# Patient Record
Sex: MALE | Race: ASIAN | NOT HISPANIC OR LATINO | URBAN - METROPOLITAN AREA
[De-identification: names, ages, dates, MRNs, and addresses within clinical notes are randomized per-mention and may not be internally consistent; named-entity substitution may affect disease eponyms.]

---

## 2019-04-22 ENCOUNTER — OFFICE VISIT - HEALTHEAST (OUTPATIENT)
Dept: FAMILY MEDICINE | Facility: CLINIC | Age: 19
End: 2019-04-22

## 2019-04-22 DIAGNOSIS — H10.13 ALLERGIC CONJUNCTIVITIS, BILATERAL: ICD-10-CM

## 2019-07-25 ENCOUNTER — OFFICE VISIT - HEALTHEAST (OUTPATIENT)
Dept: FAMILY MEDICINE | Facility: CLINIC | Age: 19
End: 2019-07-25

## 2019-07-25 ENCOUNTER — COMMUNICATION - HEALTHEAST (OUTPATIENT)
Dept: TELEHEALTH | Facility: CLINIC | Age: 19
End: 2019-07-25

## 2019-07-25 DIAGNOSIS — Z00.00 ANNUAL PHYSICAL EXAM: ICD-10-CM

## 2019-07-25 DIAGNOSIS — Z23 IMMUNIZATION DUE: ICD-10-CM

## 2019-07-25 LAB
ANION GAP SERPL CALCULATED.3IONS-SCNC: 10 MMOL/L (ref 5–18)
BUN SERPL-MCNC: 9 MG/DL (ref 8–22)
CALCIUM SERPL-MCNC: 10.5 MG/DL (ref 8.5–10.5)
CHLORIDE BLD-SCNC: 105 MMOL/L (ref 98–107)
CO2 SERPL-SCNC: 26 MMOL/L (ref 22–31)
CREAT SERPL-MCNC: 0.76 MG/DL (ref 0.7–1.3)
GFR SERPL CREATININE-BSD FRML MDRD: >60 ML/MIN/1.73M2
GLUCOSE BLD-MCNC: 76 MG/DL (ref 70–125)
HGB BLD-MCNC: 16.4 G/DL (ref 14–18)
POTASSIUM BLD-SCNC: 4.2 MMOL/L (ref 3.5–5)
SODIUM SERPL-SCNC: 141 MMOL/L (ref 136–145)

## 2019-07-25 ASSESSMENT — MIFFLIN-ST. JEOR: SCORE: 1752.43

## 2019-07-26 LAB
HBV SURFACE AB SERPL IA-ACNC: NEGATIVE M[IU]/ML
MEV IGG SER IA-ACNC: POSITIVE
MUV IGG SER QL IA: POSITIVE
RUBV IGG SERPL QL IA: POSITIVE
VZV IGG SER QL IA: NEGATIVE

## 2019-07-29 ENCOUNTER — AMBULATORY - HEALTHEAST (OUTPATIENT)
Dept: FAMILY MEDICINE | Facility: CLINIC | Age: 19
End: 2019-07-29

## 2019-07-29 ENCOUNTER — COMMUNICATION - HEALTHEAST (OUTPATIENT)
Dept: FAMILY MEDICINE | Facility: CLINIC | Age: 19
End: 2019-07-29

## 2019-07-29 DIAGNOSIS — Z23 NEED FOR VACCINATION: ICD-10-CM

## 2019-07-29 LAB
GAMMA INTERFERON BACKGROUND BLD IA-ACNC: 0.05 IU/ML
M TB IFN-G BLD-IMP: NEGATIVE
MITOGEN IGNF BCKGRD COR BLD-ACNC: -0.02 IU/ML
MITOGEN IGNF BCKGRD COR BLD-ACNC: -0.03 IU/ML
QTF INTERPRETATION: NORMAL
QTF MITOGEN - NIL: >10 IU/ML

## 2019-07-30 ENCOUNTER — AMBULATORY - HEALTHEAST (OUTPATIENT)
Dept: NURSING | Facility: CLINIC | Age: 19
End: 2019-07-30

## 2019-07-30 DIAGNOSIS — Z23 NEED FOR VACCINATION: ICD-10-CM

## 2019-08-13 ENCOUNTER — COMMUNICATION - HEALTHEAST (OUTPATIENT)
Dept: TELEHEALTH | Facility: CLINIC | Age: 19
End: 2019-08-13

## 2019-08-14 ENCOUNTER — COMMUNICATION - HEALTHEAST (OUTPATIENT)
Dept: FAMILY MEDICINE | Facility: CLINIC | Age: 19
End: 2019-08-14

## 2019-08-14 DIAGNOSIS — Z23 NEED FOR VARICELLA VACCINE: ICD-10-CM

## 2019-08-15 ENCOUNTER — AMBULATORY - HEALTHEAST (OUTPATIENT)
Dept: NURSING | Facility: CLINIC | Age: 19
End: 2019-08-15

## 2019-08-15 DIAGNOSIS — Z23 NEED FOR VARICELLA VACCINE: ICD-10-CM

## 2019-08-19 ENCOUNTER — COMMUNICATION - HEALTHEAST (OUTPATIENT)
Dept: INTERNAL MEDICINE | Facility: CLINIC | Age: 19
End: 2019-08-19

## 2019-08-19 DIAGNOSIS — Z23 NEED FOR VACCINATION: ICD-10-CM

## 2019-08-30 ENCOUNTER — AMBULATORY - HEALTHEAST (OUTPATIENT)
Dept: NURSING | Facility: CLINIC | Age: 19
End: 2019-08-30

## 2019-08-30 DIAGNOSIS — Z23 NEED FOR VACCINATION: ICD-10-CM

## 2019-09-19 ENCOUNTER — AMBULATORY - HEALTHEAST (OUTPATIENT)
Dept: NURSING | Facility: CLINIC | Age: 19
End: 2019-09-19

## 2019-09-19 DIAGNOSIS — Z23 NEED FOR VARICELLA VACCINE: ICD-10-CM

## 2019-10-02 ENCOUNTER — AMBULATORY - HEALTHEAST (OUTPATIENT)
Dept: NURSING | Facility: CLINIC | Age: 19
End: 2019-10-02

## 2020-07-24 ENCOUNTER — AMBULATORY - HEALTHEAST (OUTPATIENT)
Dept: FAMILY MEDICINE | Facility: CLINIC | Age: 20
End: 2020-07-24

## 2020-07-24 ENCOUNTER — AMBULATORY - HEALTHEAST (OUTPATIENT)
Dept: NURSING | Facility: CLINIC | Age: 20
End: 2020-07-24

## 2020-07-24 DIAGNOSIS — Z00.00 HEALTH CARE MAINTENANCE: ICD-10-CM

## 2020-07-30 ENCOUNTER — OFFICE VISIT - HEALTHEAST (OUTPATIENT)
Dept: FAMILY MEDICINE | Facility: CLINIC | Age: 20
End: 2020-07-30

## 2020-07-30 DIAGNOSIS — Z11.1 SCREENING FOR TUBERCULOSIS: ICD-10-CM

## 2020-08-04 LAB
GAMMA INTERFERON BACKGROUND BLD IA-ACNC: 0.04 IU/ML
M TB IFN-G BLD-IMP: NEGATIVE
MITOGEN IGNF BCKGRD COR BLD-ACNC: -0.01 IU/ML
MITOGEN IGNF BCKGRD COR BLD-ACNC: -0.02 IU/ML
QTF INTERPRETATION: NORMAL
QTF MITOGEN - NIL: 9.69 IU/ML

## 2020-08-20 ENCOUNTER — AMBULATORY - HEALTHEAST (OUTPATIENT)
Dept: NURSING | Facility: CLINIC | Age: 20
End: 2020-08-20

## 2020-08-20 ENCOUNTER — COMMUNICATION - HEALTHEAST (OUTPATIENT)
Dept: INTERNAL MEDICINE | Facility: CLINIC | Age: 20
End: 2020-08-20

## 2020-08-20 DIAGNOSIS — Z23 NEED FOR HEPATITIS B VACCINATION: ICD-10-CM

## 2021-01-06 ENCOUNTER — RECORDS - HEALTHEAST (OUTPATIENT)
Dept: ADMINISTRATIVE | Facility: OTHER | Age: 21
End: 2021-01-06

## 2021-02-06 ENCOUNTER — RECORDS - HEALTHEAST (OUTPATIENT)
Dept: ADMINISTRATIVE | Facility: OTHER | Age: 21
End: 2021-02-06

## 2021-02-25 ENCOUNTER — COMMUNICATION - HEALTHEAST (OUTPATIENT)
Dept: INTERNAL MEDICINE | Facility: CLINIC | Age: 21
End: 2021-02-25

## 2021-02-25 DIAGNOSIS — Z11.1 SCREENING FOR TUBERCULOSIS: ICD-10-CM

## 2021-04-14 ENCOUNTER — RECORDS - HEALTHEAST (OUTPATIENT)
Dept: ADMINISTRATIVE | Facility: OTHER | Age: 21
End: 2021-04-14

## 2021-04-15 ENCOUNTER — AMBULATORY - HEALTHEAST (OUTPATIENT)
Dept: CARE COORDINATION | Facility: CLINIC | Age: 21
End: 2021-04-15

## 2021-04-15 ENCOUNTER — COMMUNICATION - HEALTHEAST (OUTPATIENT)
Dept: NURSING | Facility: CLINIC | Age: 21
End: 2021-04-15

## 2021-04-15 DIAGNOSIS — U07.1 2019 NOVEL CORONAVIRUS DISEASE (COVID-19): ICD-10-CM

## 2021-04-16 ENCOUNTER — RECORDS - HEALTHEAST (OUTPATIENT)
Dept: ADMINISTRATIVE | Facility: OTHER | Age: 21
End: 2021-04-16

## 2021-05-31 NOTE — TELEPHONE ENCOUNTER
Patient is on the CSS on 8/30/2019 for 2nd Hep B, last dose was;    Hep B, Adult 7/30/2019       Will pend orders, please sign if appropriate.     Moni Velazquez CMA  8:28 AM  8/19/2019

## 2021-06-02 VITALS — WEIGHT: 178 LBS | BODY MASS INDEX: 29.53 KG/M2

## 2021-06-03 VITALS — WEIGHT: 182 LBS | BODY MASS INDEX: 30.32 KG/M2 | HEIGHT: 65 IN

## 2021-06-04 VITALS
HEART RATE: 65 BPM | DIASTOLIC BLOOD PRESSURE: 74 MMHG | SYSTOLIC BLOOD PRESSURE: 111 MMHG | OXYGEN SATURATION: 97 % | BODY MASS INDEX: 31.04 KG/M2 | TEMPERATURE: 98 F | WEIGHT: 186.5 LBS

## 2021-06-10 NOTE — PROGRESS NOTES
Office Visit - Follow Up   Maribell Domingo   20 y.o. male    Date of Visit: 7/30/2020    Chief Complaint   Patient presents with     Orders Request     TB Gold for Clinical        Assessment and Plan   1. Screening for tuberculosis  Screening ordered. Patient was informed of how to get his result.  - QTF-Mycobacterium tuberculosis by QuantiFERON-TB Gold Plus    The following high BMI interventions were performed this visit: encouragement to exercise and dietary management education, guidance, and counseling    No follow-ups on file.     History of Present Illness   This 20 y.o. old male patient who presents to the clinic today for tuberculosis screening.  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  Patient has no exposure to tuberculosis.  Patient denied chest pain, shortness of breath, cough, fever and chills.    Review of Systems: A comprehensive review of systems was negative except as noted.     Medications, Allergies and Problem List   Reviewed and updated     Physical Exam   General Appearance:   Well groomed    /74   Pulse 65   Temp 98  F (36.7  C) (Oral)   Wt 186 lb 8 oz (84.6 kg)   SpO2 97%   BMI 31.04 kg/m      General appearance: alert, appears stated age and cooperative  Head: Normocephalic, without obvious abnormality, atraumatic  Eyes: conjunctivae/corneas clear. PERRL, EOM's intact. Fundi benign.  Pulses: 2+ and symmetric  Skin: Skin color, texture, turgor normal. No rashes or lesions  Neurologic: Grossly normal       Additional Information   No current outpatient medications on file.     No current facility-administered medications for this visit.      No Known Allergies  Social History     Tobacco Use     Smoking status: Never Smoker     Smokeless tobacco: Never Used   Substance Use Topics     Alcohol use: No     Drug use: No          Promise VIKTORIYA Blankenship, CNP

## 2021-06-15 NOTE — TELEPHONE ENCOUNTER
Patient is on CSS 3/1/21 for TB screening. I did reach out to him to clarify which test he wanted to I could obtain order. He would like the TB gold lab test. Patient switched to lab schedule. Order pended.

## 2021-06-16 NOTE — PROGRESS NOTES
Clinic Care Coordination Contact  Community Health Worker Initial Outreach    Patient accepts CC: No, no needs. Patient will be sent Care Coordination introduction letter for future reference.

## 2021-06-17 NOTE — PATIENT INSTRUCTIONS - HE
Patient Instructions by Rosemarie Avila CNP at 4/22/2019 10:30 AM     Author: Rosemarie Avila CNP Service: -- Author Type: Nurse Practitioner    Filed: 4/22/2019 10:46 AM Encounter Date: 4/22/2019 Status: Addendum    : Rosemarie Avila CNP (Nurse Practitioner)    Related Notes: Original Note by Rosemarie Avila CNP (Nurse Practitioner) filed at 4/22/2019 10:46 AM         Patient Education     Conjunctivitis Caused by Irritation     Your healthcare provider may prescribe eye drops to help relieve symptoms.     Conjunctivitis may be caused by allergies to animals, chemicals, or other irritants. This includes eye drops. Most eye drops contain chemicals (preservatives) that may irritate your eyes. The problem can keep coming back. This can lead to an eye infection. Treatment involves relieving your symptoms and avoiding the irritant. If you have an infection, it will be treated.  Allergies  Grass, pollen, dust, mold, and animals are common causes of allergies. They can make your eyes red, watery, and itchy. In most cases, both eyes are affected.  Treatment  The best way to control an allergy is to avoid its source. Cold compresses and eye drops can help reduce the swelling. They can also help relieve redness and itching. If your allergy is severe, your healthcare provider may prescribe eye drops or oral medicines. Symptoms may take several weeks to clear up.  Other irritants  Pollution, smoke, contact lenses, and makeup can irritate your eyes. Your eyes can get red, sore, puffy, and watery. One or both eyes may become irritated.  Treatment  The best thing to do is avoid the irritant. Artificial tears can help flush out the eye and lubricate the surface. Your healthcare provider may also prescribe eye drops to reduce swelling and relieve redness. In some cases, you may have to stop wearing contact lenses or certain types of eye makeup.  Date Last Reviewed: 10/1/2017    5274-6287 [employer],  [place]. [address] [place] [address]. All rights reserved. This information is not intended as a substitute for professional medical care. Always follow your healthcare professional's instructions.           Patient Education

## 2021-06-19 NOTE — LETTER
Letter by Davida Blankenship FNP at      Author: Davida Blankenship FNP Service: -- Author Type: --    Filed:  Encounter Date: 7/29/2019 Status: (Other)         Maribell Domingo  [address] [facility] [address]             July 29, 2019         Dear Mr. Domingo,    Below are the results from your recent visit:    Resulted Orders   QTF-Mycobacterium tuberculosis by QuantiFERON-TB Gold Plus   Result Value Ref Range    QTF RESULT Negative Negative    QTF INTREPRETATION       No interferon-gamma response to M. tuberculosis antigens was detected.  Infecton with M. tuberculosis is unlikely.  A negative result alone does not exclude infection with M. tuberculosis    QTF NIL 0.05 IU/mL    QTF ANTIGEN TB1-NIL -0.03 IU/mL    QTF ANTIGEN TB2 - NIL -0.02 IU/mL    QTF MITOGEN-NIL >10.00 IU/mL   Rubeola Antibody, IgG   Result Value Ref Range    Rubeola Antibody, IgG Positive     Narrative    Negative: Absence of detectable measles virus IgG antibodies. A negative result generally indicates that the patient has not been infected and is susceptible to measles.     Equivocal: Suggest recollection no less than one to two weeks later.    Positive: Presence of detectable measles virus IgG antibodies. A positive result generally indicates exposure to measles virus or previous vaccination.     Mumps Antibody, IgG   Result Value Ref Range    Mumps Antibody, IgG Positive     Narrative    Negative: Absence of detectable mumps virus IgG antibodies. A negative result generally indicates that the patient has not been infected and is susceptible to mumps.     Equivocal: Suggest recollection no less than one to two weeks later.    Positive: Presence of detectable mumps virus IgG antibodies. A positive result generally indicates past exposure to mumps virus or previous vaccination.     Rubella Antibody, IgG   Result Value Ref Range    Rubella Antibody, IgG Positive     Narrative    Negative: Absence of detectable rubella virus IgG antibodies. A negative  result presumes that immunity has not been acquired.     Equivocal: Suggest recollection.    Positive: Considered positive for IgG antibodies to rubella virus.   Varicella Zoster Antibody, IgG   Result Value Ref Range    Varicella Zoster Antibody IgG Negative     Narrative    Assay interference due to circulating antibodies against HIV, Hepatitis A, Hepatitis B, Hepatitis C, HAMA and Rheumatoid Factor has not been evaluated.    The assay performance in detecting antibodies to Varicella Zoster in individuals vaccinated with the FDA-licensed VZV vaccine has not been established.    Negative: Absence of detectable Varicella Zoster IgG antibodies. A negative result indicates no detectable VZV antibody, but does not rule out acute infection. It should be noted that the test usually scores negative in infected patients during the incubation period and the early stages of infection.    Equivocal: Suggest recollection.    Positive: Presence of detectable Varicella Zoster IgG antibodies. A positive result generally indicates exposure to the pathogen or administration of specific immune-globulins, but it is no indication of active infection or stage of disease.     Hepatitis B Surface Antibody (Anti-HBs) Vaccine Check   Result Value Ref Range    HBV Surface Ab (Vaccine Check) Negative (!) Positive   Basic Metabolic Panel   Result Value Ref Range    Sodium 141 136 - 145 mmol/L    Potassium 4.2 3.5 - 5.0 mmol/L    Chloride 105 98 - 107 mmol/L    CO2 26 22 - 31 mmol/L    Anion Gap, Calculation 10 5 - 18 mmol/L    Glucose 76 70 - 125 mg/dL    Calcium 10.5 8.5 - 10.5 mg/dL    BUN 9 8 - 22 mg/dL    Creatinine 0.76 0.70 - 1.30 mg/dL    GFR MDRD Af Amer >60 >60 mL/min/1.73m2    GFR MDRD Non Af Amer >60 >60 mL/min/1.73m2    Narrative    Fasting Glucose reference range is 70-99 mg/dL per  American Diabetes Association (ADA) guidelines.   Hemoglobin   Result Value Ref Range    Hemoglobin 16.4 14.0 - 18.0 g/dL           Please call with  questions or contact us using [employer].    Sincerely,        Electronically signed by REED Castro

## 2021-06-19 NOTE — LETTER
Letter by Rosemarie Avila CNP at      Author: Rosemarie Avila CNP Service: -- Author Type: --    Filed:  Encounter Date: 4/22/2019 Status: (Other)         April 22, 2019     Patient: Maribell Domingo   YOB: 2000   Date of Visit: 4/22/2019       To Whom It May Concern:    It is my medical opinion that Maribell Domingo may return to work on 4/23/19.    If you have any questions or concerns, please don't hesitate to call.    Sincerely,        Electronically signed by Rosemarie Avila CNP

## 2021-06-19 NOTE — LETTER
Letter by Davida Blankenship FNP at      Author: Davida Blankenship FNP Service: -- Author Type: --    Filed:  Encounter Date: 7/29/2019 Status: (Other)         Maribell Domingo  [address] [place] [address]             July 29, 2019         Dear Mr. Domingo,    Below are the results from your recent visit:    Resulted Orders   Rubeola Antibody, IgG   Result Value Ref Range    Rubeola Antibody, IgG Positive     Narrative    Negative: Absence of detectable measles virus IgG antibodies. A negative result generally indicates that the patient has not been infected and is susceptible to measles.     Equivocal: Suggest recollection no less than one to two weeks later.    Positive: Presence of detectable measles virus IgG antibodies. A positive result generally indicates exposure to measles virus or previous vaccination.     Mumps Antibody, IgG   Result Value Ref Range    Mumps Antibody, IgG Positive     Narrative    Negative: Absence of detectable mumps virus IgG antibodies. A negative result generally indicates that the patient has not been infected and is susceptible to mumps.     Equivocal: Suggest recollection no less than one to two weeks later.    Positive: Presence of detectable mumps virus IgG antibodies. A positive result generally indicates past exposure to mumps virus or previous vaccination.     Rubella Antibody, IgG   Result Value Ref Range    Rubella Antibody, IgG Positive     Narrative    Negative: Absence of detectable rubella virus IgG antibodies. A negative result presumes that immunity has not been acquired.     Equivocal: Suggest recollection.    Positive: Considered positive for IgG antibodies to rubella virus.   Varicella Zoster Antibody, IgG   Result Value Ref Range    Varicella Zoster Antibody IgG Negative     Narrative    Assay interference due to circulating antibodies against HIV, Hepatitis A, Hepatitis B, Hepatitis C, HAMA and Rheumatoid Factor has not been evaluated.    The assay performance in  detecting antibodies to Varicella Zoster in individuals vaccinated with the FDA-licensed VZV vaccine has not been established.    Negative: Absence of detectable Varicella Zoster IgG antibodies. A negative result indicates no detectable VZV antibody, but does not rule out acute infection. It should be noted that the test usually scores negative in infected patients during the incubation period and the early stages of infection.    Equivocal: Suggest recollection.    Positive: Presence of detectable Varicella Zoster IgG antibodies. A positive result generally indicates exposure to the pathogen or administration of specific immune-globulins, but it is no indication of active infection or stage of disease.     Hepatitis B Surface Antibody (Anti-HBs) Vaccine Check   Result Value Ref Range    HBV Surface Ab (Vaccine Check) Negative (!) Positive   Basic Metabolic Panel   Result Value Ref Range    Sodium 141 136 - 145 mmol/L    Potassium 4.2 3.5 - 5.0 mmol/L    Chloride 105 98 - 107 mmol/L    CO2 26 22 - 31 mmol/L    Anion Gap, Calculation 10 5 - 18 mmol/L    Glucose 76 70 - 125 mg/dL    Calcium 10.5 8.5 - 10.5 mg/dL    BUN 9 8 - 22 mg/dL    Creatinine 0.76 0.70 - 1.30 mg/dL    GFR MDRD Af Amer >60 >60 mL/min/1.73m2    GFR MDRD Non Af Amer >60 >60 mL/min/1.73m2    Narrative    Fasting Glucose reference range is 70-99 mg/dL per  American Diabetes Association (ADA) guidelines.   Hemoglobin   Result Value Ref Range    Hemoglobin 16.4 14.0 - 18.0 g/dL        Normal lab results.  Patient's immunizations are current except for hepatitis B.  Patient will be needing  hepatitis B vaccination.  Order placed. Please call and set up a nurse only visit for your hep B vaccine.    Please call with questions or contact us using [employer].    Sincerely,        Electronically signed by REED Castro

## 2021-06-19 NOTE — LETTER
Letter by Davida Blankenship FNP at      Author: Davida Blankenship FNP Service: -- Author Type: --    Filed:  Encounter Date: 7/29/2019 Status: (Other)         Maribell Domingo  [address] [place] [address]             July 29, 2019         Dear Mr. Domingo,    Below are the results from your recent visit:    Resulted Orders   Rubeola Antibody, IgG   Result Value Ref Range    Rubeola Antibody, IgG Positive     Narrative    Negative: Absence of detectable measles virus IgG antibodies. A negative result generally indicates that the patient has not been infected and is susceptible to measles.     Equivocal: Suggest recollection no less than one to two weeks later.    Positive: Presence of detectable measles virus IgG antibodies. A positive result generally indicates exposure to measles virus or previous vaccination.     Mumps Antibody, IgG   Result Value Ref Range    Mumps Antibody, IgG Positive     Narrative    Negative: Absence of detectable mumps virus IgG antibodies. A negative result generally indicates that the patient has not been infected and is susceptible to mumps.     Equivocal: Suggest recollection no less than one to two weeks later.    Positive: Presence of detectable mumps virus IgG antibodies. A positive result generally indicates past exposure to mumps virus or previous vaccination.     Rubella Antibody, IgG   Result Value Ref Range    Rubella Antibody, IgG Positive     Narrative    Negative: Absence of detectable rubella virus IgG antibodies. A negative result presumes that immunity has not been acquired.     Equivocal: Suggest recollection.    Positive: Considered positive for IgG antibodies to rubella virus.   Varicella Zoster Antibody, IgG   Result Value Ref Range    Varicella Zoster Antibody IgG Negative     Narrative    Assay interference due to circulating antibodies against HIV, Hepatitis A, Hepatitis B, Hepatitis C, HAMA and Rheumatoid Factor has not been evaluated.    The assay performance in  detecting antibodies to Varicella Zoster in individuals vaccinated with the FDA-licensed VZV vaccine has not been established.    Negative: Absence of detectable Varicella Zoster IgG antibodies. A negative result indicates no detectable VZV antibody, but does not rule out acute infection. It should be noted that the test usually scores negative in infected patients during the incubation period and the early stages of infection.    Equivocal: Suggest recollection.    Positive: Presence of detectable Varicella Zoster IgG antibodies. A positive result generally indicates exposure to the pathogen or administration of specific immune-globulins, but it is no indication of active infection or stage of disease.     Hepatitis B Surface Antibody (Anti-HBs) Vaccine Check   Result Value Ref Range    HBV Surface Ab (Vaccine Check) Negative (!) Positive   Basic Metabolic Panel   Result Value Ref Range    Sodium 141 136 - 145 mmol/L    Potassium 4.2 3.5 - 5.0 mmol/L    Chloride 105 98 - 107 mmol/L    CO2 26 22 - 31 mmol/L    Anion Gap, Calculation 10 5 - 18 mmol/L    Glucose 76 70 - 125 mg/dL    Calcium 10.5 8.5 - 10.5 mg/dL    BUN 9 8 - 22 mg/dL    Creatinine 0.76 0.70 - 1.30 mg/dL    GFR MDRD Af Amer >60 >60 mL/min/1.73m2    GFR MDRD Non Af Amer >60 >60 mL/min/1.73m2    Narrative    Fasting Glucose reference range is 70-99 mg/dL per  American Diabetes Association (ADA) guidelines.   Hemoglobin   Result Value Ref Range    Hemoglobin 16.4 14.0 - 18.0 g/dL       Normal lab results.  Patient's immunizations are current except for hepatitis B.  Patient will be needing hepatitis B vaccination.  Order placed    Please call with questions or contact us using [employer].    Sincerely,        Electronically signed by REED Castro

## 2021-06-21 NOTE — LETTER
Letter by Isabella Weir CHW at      Author: Isabella Weir CHW Service: -- Author Type: --    Filed:  Encounter Date: 4/15/2021 Status: (Other)       CARE COORDINATION  [address]  [facility] [address]    April 15, 2021    Maribell Domingo  [address] [address]      Dear Maribell,    I am a clinic community health worker who works with REED Castro at [facility]. I wanted to thank you for spending the time to talk with me.  Below is a description of clinic care coordination and how I can further assist you.      The clinic care coordination team is made up of a registered nurse,  and community health worker who understand the health care system. The goal of clinic care coordination is to help you manage your health and improve access to the health care system in the most efficient manner. The team can assist you in meeting your health care goals by providing education, coordinating services, strengthening the communication among your providers and supporting you with any resource needs.    Please feel free to contact me at [phone] with any questions or concerns. We are focused on providing you with the highest-quality healthcare experience possible and that all starts with you.     Sincerely,     STORMY Feldman  Clinic Care Coordination   [phone]  [email or web address]

## 2021-06-27 ENCOUNTER — HEALTH MAINTENANCE LETTER (OUTPATIENT)
Age: 21
End: 2021-06-27

## 2021-06-27 NOTE — PROGRESS NOTES
Progress Notes by Davida Blankenship FNP at 7/25/2019 11:10 AM     Author: Davida Blankenship FNP Service: -- Author Type: Nurse Practitioner    Filed: 7/25/2019 12:34 PM Encounter Date: 7/25/2019 Status: Signed    : Davida Blankenship FNP (Nurse Practitioner)       MALE PREVENTATIVE EXAM    Assessment and Plan:   1. Annual physical exam  Healthy male exam  - Basic Metabolic Panel  - Hemoglobin    2. Immunization due  Record does not show that patient has received all of his immunization. I recommend getting a titre as patient believe he did receive most of his vaccines.   - QTF-Mycobacterium tuberculosis by QuantiFERON-TB Gold Plus  - Rubeola Antibody, IgG  - Mumps Antibody, IgG  - Rubella Antibody, IgG  - Varicella Zoster Antibody, IgG  - Hepatitis B Surface Antibody (Anti-HBs) Vaccine Check      Next follow up:  No follow-ups on file.    Immunization Review  Adult Imm Review: Unknown status, attempting to get records      I discussed the following with the patient:   Adult Healthy Living: Importance of regular exercise  Healthy nutrition  Getting adequate sleep  Stress management  Use of seat belts  Distracted driving  Helmets    I have had an Advance Directives discussion with the patient.    Subjective:   Chief Complaint: Maribell Domingo is an 19 y.o. male here for a preventative health visit.     HPI:  Patient reports that he is doing well but will be needing record of his immunization. He reports that he need it for college reasons. Patient denied chest pain, shortness of breath, fever and chills.     Healthy Habits  Are you taking a daily aspirin? No  Do you typically exercising at least 40 min, 3-4 times per week?  NO  Do you usually eat at least 4 servings of fruit and vegetables a day, include whole grains and fiber and avoid regularly eating high fat foods? Yes  Have you had an eye exam in the past two years? Yes  Do you see a dentist twice per year? NO  Do you have any concerns regarding sleep?  "No    Safety Screen  If you own firearms, are they secured in a locked gun cabinet or with trigger locks? The patient does not own any firearms  Do you feel you are safe where you are living?: Yes (4/22/2019 10:38 AM)  Do you feel you are safe in your relationship(s)?: Yes (4/22/2019 10:38 AM)      Review of Systems:  Please see above.  The rest of the review of systems are negative for all systems.     Cancer Screening     Patient has no health maintenance due at this time          Patient Care Team:  Davida Blankenship FNP as PCP - General (Nurse Practitioner)        History     Not marked as reviewed during this visit.            Objective:   Vital Signs: There were no vitals taken for this visit.       PHYSICAL EXAM  /79   Pulse 88   Ht 5' 5\" (1.651 m)   Wt 182 lb (82.6 kg)   SpO2 95%   BMI 30.29 kg/m    General appearance: alert, appears stated age and cooperative  Head: Normocephalic, without obvious abnormality, atraumatic  Eyes: conjunctivae/corneas clear. PERRL, EOM's intact. Fundi benign.  Ears: normal TM's and external ear canals both ears  Throat: lips, mucosa, and tongue normal; teeth and gums normal  Neck: no adenopathy, no carotid bruit, no JVD, supple, symmetrical, trachea midline and thyroid not enlarged, symmetric, no tenderness/mass/nodules  Back: symmetric, no curvature. ROM normal. No CVA tenderness.  Lungs: clear to auscultation bilaterally  Heart: regular rate and rhythm, S1, S2 normal, no murmur, click, rub or gallop  Abdomen: soft, non-tender; bowel sounds normal; no masses,  no organomegaly  Extremities: extremities normal, atraumatic, no cyanosis or edema  Pulses: 2+ and symmetric  Skin: Skin color, texture, turgor normal. No rashes or lesions  Lymph nodes: Cervical, supraclavicular, and axillary nodes normal.  Neurologic: Grossly normal          Medication List           Accurate as of 7/25/19 12:33 PM. If you have any questions, ask your nurse or doctor.               STOP " taking these medications    benzoyl peroxide 10 % gel  Stopped by:  REED Castro     cefuroxime 250 MG tablet  Also known as:  CEFTIN  Stopped by:  REED Castro     olopatadine 0.1 % ophthalmic solution  Also known as:  PATANOL  Stopped by:  REED Castro     tretinoin 0.025 % cream  Also known as:  RETIN-A  Stopped by:  REED Castro            Additional Screenings Completed Today:

## 2021-06-27 NOTE — PROGRESS NOTES
Progress Notes by Rosemarie Avila CNP at 4/22/2019 10:30 AM     Author: Rosemarie Avila CNP Service: -- Author Type: Nurse Practitioner    Filed: 4/22/2019 11:02 AM Encounter Date: 4/22/2019 Status: Signed    : Rosemarie Avila CNP (Nurse Practitioner)       ASSESSMENT:  1. Allergic conjunctivitis, bilateral  olopatadine (PATANOL) 0.1 % ophthalmic solution         PLAN:  Advised warm moist compresses to eye to sooth and clear discharge.  Patanol drops for symptoms. Patient handout on conjunctivitis provided.  Follow-up with ophthalmologist or PCP if symptoms not improved within 24-48 hours, sooner if worsening in any way.    PATIENT INSTRUCTIONS:  Patient Instructions       Patient Education     Conjunctivitis Caused by Irritation     Your healthcare provider may prescribe eye drops to help relieve symptoms.     Conjunctivitis may be caused by allergies to animals, chemicals, or other irritants. This includes eye drops. Most eye drops contain chemicals (preservatives) that may irritate your eyes. The problem can keep coming back. This can lead to an eye infection. Treatment involves relieving your symptoms and avoiding the irritant. If you have an infection, it will be treated.  Allergies  Grass, pollen, dust, mold, and animals are common causes of allergies. They can make your eyes red, watery, and itchy. In most cases, both eyes are affected.  Treatment  The best way to control an allergy is to avoid its source. Cold compresses and eye drops can help reduce the swelling. They can also help relieve redness and itching. If your allergy is severe, your healthcare provider may prescribe eye drops or oral medicines. Symptoms may take several weeks to clear up.  Other irritants  Pollution, smoke, contact lenses, and makeup can irritate your eyes. Your eyes can get red, sore, puffy, and watery. One or both eyes may become irritated.  Treatment  The best thing to do is avoid the irritant. Artificial tears  can help flush out the eye and lubricate the surface. Your healthcare provider may also prescribe eye drops to reduce swelling and relieve redness. In some cases, you may have to stop wearing contact lenses or certain types of eye makeup.  Date Last Reviewed: 10/1/2017    1865-4741 [employer]. [address] [place], [place] [id number]. All rights reserved. This information is not intended as a substitute for professional medical care. Always follow your healthcare professional's instructions.           Patient Education            SUBJECTIVE:   Maribell Domingo is a 19 y.o. male presents today with itching and watering to the bilateral eyes onset when he awoke this morning.  No crusting or drainage.  Notes he was outside for quite some time yesterday [place].  Does have a history of seasonal allergies.  No URI symptoms.  No eye pain, no visual changes.  He does not wear contact lenses.  No fevers.  Has not tried any treatment at home.    There is no problem list on file for this patient.      Current Medications:  Current Outpatient Medications on File Prior to Visit   Medication Sig Dispense Refill   ? benzoyl peroxide 10 % gel Apply topically daily.     ? cefuroxime (CEFTIN) 250 MG tablet Take 250 mg by mouth once daily.     ? tretinoin (RETIN-A) 0.025 % cream Apply topically bedtime.       No current facility-administered medications on file prior to visit.        Allergies:   No Known Allergies    OBJECTIVE:    Vitals:    04/22/19 1038   BP: 108/72   Patient Site: Right Arm   Patient Position: Sitting   Cuff Size: Adult Large   Pulse: 64   Resp: 18   Temp: 98.4  F (36.9  C)   TempSrc: Oral   SpO2: 98%   Weight: 178 lb (80.7 kg)       Physical exam reveals a pleasant 19 y.o. male.   Appears healthy, alert and cooperative.  Eyes: bilateral mild conjunctival injection. PERRLA. EOMS intact. negative foreign body.  Mild swelling of the bilateral upper eyelids.  No periorbital erythema, no pain  with EOM. No pain with palpation of periorbital area.  Lungs: even, unlabored resp  Heart: regular rate        Rosemarie Avila, CNP

## 2021-10-17 ENCOUNTER — HEALTH MAINTENANCE LETTER (OUTPATIENT)
Age: 21
End: 2021-10-17

## 2022-07-24 ENCOUNTER — HEALTH MAINTENANCE LETTER (OUTPATIENT)
Age: 22
End: 2022-07-24

## 2022-10-02 ENCOUNTER — HEALTH MAINTENANCE LETTER (OUTPATIENT)
Age: 22
End: 2022-10-02

## 2023-08-12 ENCOUNTER — HEALTH MAINTENANCE LETTER (OUTPATIENT)
Age: 23
End: 2023-08-12